# Patient Record
Sex: FEMALE | Employment: UNEMPLOYED | URBAN - METROPOLITAN AREA
[De-identification: names, ages, dates, MRNs, and addresses within clinical notes are randomized per-mention and may not be internally consistent; named-entity substitution may affect disease eponyms.]

---

## 2021-10-14 ENCOUNTER — HOSPITAL ENCOUNTER (OUTPATIENT)
Dept: PHYSICAL THERAPY | Age: 56
Discharge: HOME | End: 2021-10-14

## 2021-10-14 PROCEDURE — 97161 PT EVAL LOW COMPLEX 20 MIN: CPT

## 2021-10-14 NOTE — THERAPY EVALUATION
Cindy Lanza  : 1965  Primary:   Secondary:  [address] at [facility]  [address], [address], [place]  [phone]   [phone]          OUTPATIENT PHYSICAL THERAPY:Initial Assessment 10/14/2021   ICD-10: Treatment Diagnosis: other lymphedema I.89.0  Stiffness of left shoulder not elsewhere classified M 25.612  Precautions/Allergies:   Patient has no known allergies. TREATMENT PLAN:  Effective Dates: 10/14/2021 TO 2022 (90 days). 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 Aquino Ear oncologist : Dr. Mallorie Carrasquillo:  Ms. Baptiste Sites presents following diagnosis and treatment of left breast cancer. She underwent partial mastectomy on the left 8/10/21 and SNB 21.  3 nodes were assessed and were negative. She is currently in radiation. No chemo is scheduled. She has developed limited mobility in the left shoulder. She is at risk for lymphedema. She will benefit from therapeutic exercises, manual therapy and lymphedema education. We will monitor her edema with radiation. If needed we will refer for a compression garment. She is debilitated due to a previous back surgery due to scoliosis. She has CRPS of the left lower extremity and uses a cane for mobility. PROBLEM LIST (Impacting functional limitations):  1. Decreased Strength  2. Increased Pain  3. Decreased Flexibility/Joint Mobility  4. Edema/Girth  5. Decreased Knowledge of Precautions  6. Decreased [place] with Home Exercise Program INTERVENTIONS PLANNED: (Treatment may consist of any combination of the following)  1. Decongestion Therapy  2. Home Exercise Program (HEP)  3. Manual Therapy  4.  Range of Motion (ROM)  5. Therapeutic Exercise/Strengthening     GOALS: (Goals have been discussed and agreed upon with patient.)  Short-Term Functional Goals: Time Frame: 4 weeks  1. The patient will be independent with HEP for ROM within 4 weeks. 2. The patient will have knowledge of signs and symptoms of lymphedema and how to manage within 4 weeks. 3. The patient will gain 10 degrees flexion, abduction and external rotation within 4 weeks  Discharge Goals: Time Frame: 8 weeks  1. .the patient will improve her DASH by 5 within 8 weeks. 2. The patient will improve her LLIS by 5 within 8 weeks. 3. The patient will be fit with a compression garment as necessary within 8 weeks. OUTCOME MEASURE:   Tool Used: Disabilities of the Arm, Shoulder and Hand (DASH) Questionnaire - Quick Version  Score:  Initial: 26/55  Most Recent: X/55 (Date: -- )   Interpretation of Score: The DASH is designed to measure the activities of daily living in person's with upper extremity dysfunction or pain. Each section is scored on a 1-5 scale, 5 representing the greatest disability. The scores of each section are added together for a total score of 55. Tool Used: ECOG Performance Survey Score  Score:  Initial: 1 Most Recent:      Interpretation of Score:   0 Fully active, able to carry on all pre-disease performance without restriction   1 Restricted in physically strenuous activity but ambulatory and able to carry out work of a light or sedentary nature, e.g., light house work, office work   2 Ambulatory and capable of all selfcare but unable to carry out any work activities. Up and about more than 50% of waking hours   3 Capable of only limited selfcare, confined to bed or chair more than 50% of waking hours   4 Completely disabled. Cannot carry on any selfcare. Totally confined to bed or chair   5 Dead        Tool Used: Lymphedema Life Impact Scale   Score:  Initial: 26 Most Recent: X (Date: -- )   Interpretation of Score:  The Lymphedema Life Impact Scale (LLIS) is a validated instrument that measures the physical, functional, and psychosocial concerns pertinent to patients with extremity lymphedema. The Scale's questionnaire is administered to patients to gauge impairments, activity limitations, and participation restrictions resulting from their lymphedema. MEDICAL NECESSITY:   · Patient is expected to demonstrate progress in strength, range of motion and edema management to reduce risk of cellulitis. REASON FOR SERVICES/OTHER COMMENTS:  · Patient continues to demonstrate capacity to improve strength, ROM and edema management which will increase independence. Total Duration:  PT Patient Time In/Time Out  Time In: 0900  Time Out: 1011    Rehabilitation Potential For Stated Goals: Good  Regarding Laurel Joyner's therapy, I certify that the treatment plan above will be carried out by a therapist or under their direction. Thank you for this referral,  Maria Luisa Esteban, PT     Referring Physician Signature: Bslexi, Not On File, MD _______________________________ Date _____________      PAIN/SUBJECTIVE:   Initial: Pain Intensity 1: 3  Pain Location 1: Breast  Pain Orientation 1: Left   Post Session:  3/10   HISTORY:   History of Injury/Illness (Reason for Referral):  Left breast cancer, radiation, partial mastectomy with SNB  Past Medical History/Comorbidities:   Ms. Conrado Arteaga  has no past medical history on file. Ms. Conrado Arteaga  has no past surgical history on file.     Social History/Living Environment:     lives alone  Prior Level of Function/Work/Activity:  Not currently working  Dominant Side:         RIGHT   Ambulatory/Rehab Services H2 Model Falls Risk Assessment   Risk Factors:       (1)  Visual Impairment [specify:  cataracts] Ability to Rise from Chair:       (1)  Pushes up, successful in one attempt   Falls Prevention Plan:       Physical Limitations to Exercise (specify):  scoliosis, CRPS   Total: (5 or greater = High Risk): 2   ©2010 [employer] [place] [facility] Patent #[id number]. Federal Law prohibits the replication, distribution or use without written permission from [facility] [employer] [facility]   Current Medications:       Current Outpatient Medications:     gabapentin (NEURONTIN) 300 mg capsule, TAKE 1 CAPSULE BY MOUTH THREE TIMES DAILY, Disp: , Rfl:     dextroamphetamine/amphetamine (ADDERALL PO), Take  by mouth., Disp: , Rfl:     naproxen sodium (ALEVE) 220 mg tablet, Take 220 mg by mouth two (2) times daily (with meals). , Disp: , Rfl:     duloxetine HCl (CYMBALTA PO), Take  by mouth., Disp: , Rfl:     clonazepam (KLONOPIN PO), Take  by mouth., Disp: , Rfl:     tizanidine HCl (ZANAFLEX PO), Take  by mouth., Disp: , Rfl:     predniSONE (STERAPRED) 5 mg dose pack, TAKE AS DIRECTED, Disp: , Rfl:    Date Last Reviewed:  10/14/21   Number of Personal Factors/Comorbidities that affect the Plan of Care: 1-2: MODERATE COMPLEXITY   EXAMINATION:   Palpation:          Minimal tenderness  ROM:          Right flexion 160, abduction 135, external rotation 54  Left flexion 145, abduction 120, external rotation 38  Strength:          Grossly 4/5  Functional Mobility:         Gait/Ambulation:  Independent with cane, altered gait due to CRPS of the left lower extremity        Transfers:  independent        Bed Mobility:  independent  Skin Integrity:          Pink due to radiation  Edema/Girth:  non-pitting    Left Right    Initial Most Recent Initial Most Recent   Upper  Extremity Base 3rd Finger (cm): 18  Wrist (cm): 15.3  Mid Forearm (cm): 19.6  Elbow (cm): 26.9  Axilla (cm): 33 (42.3, 40)   Base 3rd Finger (cm): 18.7  Wrist (cm): 15.5  Mid Forearm (cm): 21.3  Elbow (cm): 27.5  Axilla (cm): 35.5 (42.7, 38.3)     Lower  Extremity               Body Structures Involved:  1. Joints  2. Muscles  3. lymphatic system Body Functions Affected:  1. Sensory/Pain  2.  Neuromusculoskeletal Activities and Participation Affected:  1.  Mobility   Number of elements (examined above) that affect the Plan of Care: 4+: HIGH COMPLEXITY   CLINICAL PRESENTATION:   Presentation: Stable and uncomplicated: LOW COMPLEXITY   CLINICAL DECISION MAKING:   Use of outcome tool(s) and clinical judgement create a POC that gives a: Clear prediction of patient's progress: LOW COMPLEXITY

## 2021-10-14 NOTE — PROGRESS NOTES
Berta Foster  : 1965  Primary:   Secondary:  [address]  at [facility]  [address], [address], [place] [address]  [phone]   [phone]        OUTPATIENT PHYSICAL THERAPY: Daily Treatment Note 10/14/2021  Visit Count:  1       ICD-10: Treatment Diagnosis: other lymphedema I.89.0  Stiffness of left shoulder not elsewhere classified M 25.612  Precautions/Allergies:   Patient has no known allergies. TREATMENT PLAN:  Effective Dates: 10/14/2021 TO 2022 (90 days). Frequency/Duration: 1 time a week for 90 Day(s)       Pre-treatment Symptoms/Complaints:  My breast doesn't bother me as much as my foot. Pain: Initial: Pain Intensity 1: 3  Pain Location 1: Breast  Pain Orientation 1: Left  /10 Post Session:  3/10   Medications Last Reviewed:  10/14/2021  Updated Objective Findings:  See evaluation note from today  TREATMENT:     THERAPEUTIC EXERCISE: (15 minutes):  Exercises per grid below to improve mobility. Required minimal verbal cues to promote proper body alignment. Progressed range as indicated. MANUAL THERAPY: (10 minutes): Complex decongestive physiotherapy was utilized and necessary because of the patient's risk of edema.    educated in lymphedema,  Instructed in below exercises for mobility. Given written program.  Lanie Contreras PortalAccess Code: [email or web address]  URL: [email or web address]  Date: 10/14/2021  Prepared by: Rachel Sharma    Exercises  Supine Lower Trunk Rotation - 2 x daily - 7 x weekly - 1 sets - 10 reps - 5 hold  Supine Shoulder Flexion with Dowel - 2 x daily - 7 x weekly - 1 sets - 10 reps - 5 hold  Supine Shoulder External Rotation with Dowel - 2 x daily - 7 x weekly - 1 sets - 10 reps - 5 hold  Supine PNF D2 - 2 x daily - 7 x weekly - 1 sets - 10 reps - 5 hold      Treatment/Session Summary:    · Response to Treatment:  tolerated the treatment well.  understood lymphedema and possible need for compression. .  · Communication/Consultation:  HEP  · Equipment provided today:  None today  · Recommendations/Intent for next treatment session: Next visit will focus on assess girth, assess ROM, assess need for compression.     Total Treatment Billable Duration:  25 minutes  PT Patient Time In/Time Out  Time In: 0900  Time Out: KENNY Pritchett    Future Appointments   Date Time Provider Vadim Boland   10/28/2021 10:00 AM [facility]

## 2021-10-28 ENCOUNTER — APPOINTMENT (OUTPATIENT)
Dept: PHYSICAL THERAPY | Age: 56
End: 2021-10-28

## 2021-11-04 ENCOUNTER — APPOINTMENT (OUTPATIENT)
Dept: PHYSICAL THERAPY | Age: 56
End: 2021-11-04

## 2021-11-08 ENCOUNTER — HOSPITAL ENCOUNTER (OUTPATIENT)
Dept: PHYSICAL THERAPY | Age: 56
Discharge: HOME | End: 2021-11-08

## 2021-11-16 ENCOUNTER — HOSPITAL ENCOUNTER (OUTPATIENT)
Dept: PHYSICAL THERAPY | Age: 56
Discharge: HOME | End: 2021-11-16

## 2021-11-16 PROCEDURE — 97140 MANUAL THERAPY 1/> REGIONS: CPT

## 2021-11-16 NOTE — PROGRESS NOTES
Srinivas Jolly  : 1965  Primary:   Secondary:  [address] at [facility]  [address], [address], [place] [address]  [phone]   [phone]        OUTPATIENT PHYSICAL THERAPY: Daily Treatment Note 2021  Visit Count:  2       ICD-10: Treatment Diagnosis: other lymphedema I.89.0  Stiffness of left shoulder not elsewhere classified M 25.612  Precautions/Allergies:   Patient has no known allergies. TREATMENT PLAN:  Effective Dates: 10/14/2021 TO 2022 (90 days). Frequency/Duration: 1 time a week for 90 Day(s)       Pre-treatment Symptoms/Complaints:  My breast is very painful now that radiation is over. The skin is raw and I am having to use cream and pain pills to get relief. Pain: Initial: Pain Intensity 1: 8  Pain Location 1: Breast  Pain Orientation 1: Left  /10 Post Session:  8/10   Medications Last Reviewed:  2021  Updated Objective Findings:  As below   Edema/Girth:  non-pitting    Left Right    Initial Most Recent Initial Most Recent   Upper  Extremity Base 3rd Finger (cm): 17.7  Wrist (cm): 15.5  Mid Forearm (cm): 20.2  Elbow (cm): 28  Axilla (cm): 35.1 (43.6, 43.8)         Lower  Extremity               TREATMENT:     THERAPEUTIC EXERCISE: ( minutes):  Exercises per grid below to improve mobility. Required minimal verbal cues to promote proper body alignment. Progressed range as indicated. MANUAL THERAPY: (31 minutes): Complex decongestive physiotherapy was utilized and necessary because of the patient's risk of edema. assessed skin integrity. Assessed girth. Tissue is fragile and open on the left breast.  Girth is increased in the left upper arm. The patient was advised to stop exercises and allow the tissue to heal.  She will return in 2 weeks. We will assess girth and ROM. We will decide if she needs a compression garment. The edema at this time is due to radiation. Once the tissue heals she will wear a supportive bra. [employer] PortalAccess Code: [email or web address]  URL: [email or web address]  Date: 10/14/2021  Prepared by: Juan Diego Metzger    Exercises  Supine Lower Trunk Rotation - 2 x daily - 7 x weekly - 1 sets - 10 reps - 5 hold  Supine Shoulder Flexion with Dowel - 2 x daily - 7 x weekly - 1 sets - 10 reps - 5 hold  Supine Shoulder External Rotation with Dowel - 2 x daily - 7 x weekly - 1 sets - 10 reps - 5 hold  Supine PNF D2 - 2 x daily - 7 x weekly - 1 sets - 10 reps - 5 hold      Treatment/Session Summary:    · Response to Treatment:  tolerated the treatment fair. The patient was grimacing and holding her breast.  Will need to stop exercises and allow tissue to heal before resuming. · Communication/Consultation:  HEP  · Equipment provided today:  None today  · Recommendations/Intent for next treatment session: Next visit will focus on assess girth, assess ROM, assess need for compression.     Total Treatment Billable Duration:  31 minutes  PT Patient Time In/Time Out  Time In: 1100  Time Out: [facility], PT    Future Appointments   Date Time Provider Vadim Boland   11/30/2021  1:00 PM Nydia Rushing, PT [facility]

## 2021-12-06 ENCOUNTER — HOSPITAL ENCOUNTER (OUTPATIENT)
Dept: PHYSICAL THERAPY | Age: 56
Discharge: HOME | End: 2021-12-06

## 2021-12-06 PROCEDURE — 97110 THERAPEUTIC EXERCISES: CPT

## 2021-12-06 PROCEDURE — 97140 MANUAL THERAPY 1/> REGIONS: CPT

## 2021-12-06 NOTE — PROGRESS NOTES
Pasquale Mean  : 1965  Primary:   Secondary:  [address]  at [facility]  [address], [address], [place] [address]  [phone]   [phone]        OUTPATIENT PHYSICAL THERAPY: Daily Treatment Note 2021  Visit Count:  3       ICD-10: Treatment Diagnosis: other lymphedema I.89.0  Stiffness of left shoulder not elsewhere classified M 25.612  Precautions/Allergies:   Patient has no known allergies. TREATMENT PLAN:  Effective Dates: 10/14/2021 TO 2022 (90 days). Frequency/Duration: 1 time a week for 90 Day(s)       Pre-treatment Symptoms/Complaints:  The patient reports her breast has healed. She reports she has been doing her exercises, but forgot to start wearing a sports bra. She reports her radiation doctor feels she has breast lymphedema. Pain: Initial: Pain Intensity 1: 0  /10 Post Session:  0/10   Medications Last Reviewed:  2021  Updated Objective Findings:  As below   Edema/Girth:  non-pitting    Left Right    Initial Most Recent Initial Most Recent   Upper  Extremity Base 3rd Finger (cm): 18.1  Wrist (cm): 15.5  Mid Forearm (cm): 19.5  Elbow (cm): 27.1  Axilla (cm): 33 (41.2, 41.9)         Lower  Extremity             Flexion 175, abduction 175, external rotation 80  TREATMENT:     THERAPEUTIC EXERCISE: (19 minutes):  Exercises per grid below to improve mobility. Required minimal verbal cues to promote proper body alignment. Progressed range as indicated. MANUAL THERAPY: (30 minutes): Complex decongestive physiotherapy was utilized and necessary because of the patient's risk of edema. assessed skin integrity. Tissue is healed with the exception of a small area along the upper medial breast which has been open since before surgery per the patient. Assessed girth. Girth is improved. Assessed ROM and it is within normal limits. She performed all below exercises. Added corner stretch.   A pad was fashioned with 1/2 inch gray foam, cotton padding and gauze for the breast to be worn in a sports bra. She will return in 2 weeks. We will assess girth and ROM. The edema at this time is due to radiation. [employer] PortalAccess Code: [email or web address]  URL: [email or web address]  Date: 10/14/2021  Prepared by: Palomo Midfield    Exercises  Supine Lower Trunk Rotation - 2 x daily - 7 x weekly - 1 sets - 10 reps - 5 hold  Supine Shoulder Flexion with Dowel - 2 x daily - 7 x weekly - 1 sets - 10 reps - 5 hold  Supine Shoulder External Rotation with Dowel - 2 x daily - 7 x weekly - 1 sets - 10 reps - 5 hold  Supine PNF D2 - 2 x daily - 7 x weekly - 1 sets - 10 reps - 5 hold    Access Code: [email or web address]  URL: [email or web address]  Date: 12/06/2021  Prepared by: Stacia Vazquez    Exercises  Corner Pec Major Stretch - 2 x daily - 7 x weekly - 1 sets - 5 reps - 5 hold    Treatment/Session Summary:    · Response to Treatment:  tolerated the treatment well. ROM and Girth improved. The patient was grimacing and holding her breast.  Will benefit from breast compression. · Communication/Consultation:  HEP  Wear the pad in a sports bra. Increase tolerance to all day. · Equipment provided today:  None today  · Recommendations/Intent for next treatment session: Next visit will focus on assess girth, assess ROM, assess need for compression.     Total Treatment Billable Duration:  49 minutes  PT Patient Time In/Time Out  Time In: 1043  Time Out: [address], PT    Future Appointments   Date Time Provider Vadim Boland   12/22/2021 11:00 AM Addy Rushing, PT [facility]

## 2021-12-22 ENCOUNTER — HOSPITAL ENCOUNTER (OUTPATIENT)
Dept: PHYSICAL THERAPY | Age: 56
Discharge: HOME | End: 2021-12-22

## 2021-12-22 NOTE — PROGRESS NOTES
The patient called to cancel oncology rehab. She will be attending another location due to having sponsorship.

## 2022-01-04 NOTE — THERAPY DISCHARGE
Stephania Goldmann  : 1965  Primary:   Secondary:  [address] at [facility]  [address], [address], [place] [address]  [phone]   [phone]          OUTPATIENT PHYSICAL THERAPY:Discontinuation Summary 2021   ICD-10: Treatment Diagnosis: other lymphedema I.89.0  Stiffness of left shoulder not elsewhere classified M 25.612  Precautions/Allergies:   Patient has no known allergies. TREATMENT PLAN:  Effective Dates: 10/14/2021 TO 2022 (90 days). 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 Mark Fuentes oncologist : Dr. Marisela Becerra:  Ms. Yaneli Villagomez presents following diagnosis and treatment of left breast cancer. She underwent partial mastectomy on the left 8/10/21 and SNB 21.  3 nodes were assessed and were negative. She is currently in radiation. No chemo is scheduled. She has developed limited mobility in the left shoulder. She is at risk for lymphedema. She will benefit from therapeutic exercises, manual therapy and lymphedema education. We will monitor her edema with radiation. If needed we will refer for a compression garment. She is debilitated due to a previous back surgery due to scoliosis. She has CRPS of the left lower extremity and uses a cane for mobility. 22: The patient attended a total of 3 visits. She called to cancel her remaining appointments. She will be going to another location due to having sponsorship there. No further visits. Discontinue at this time. PROBLEM LIST (Impacting functional limitations):  1. Decreased Strength  2. Increased Pain  3. Decreased Flexibility/Joint Mobility  4. Edema/Girth  5. Decreased Knowledge of Precautions  6.  Decreased [place] with Home Exercise Program INTERVENTIONS PLANNED: (Treatment may consist of any combination of the following)  1. Decongestion Therapy  2. Home Exercise Program (HEP)  3. Manual Therapy  4. Range of Motion (ROM)  5. Therapeutic Exercise/Strengthening     GOALS: (Goals have been discussed and agreed upon with patient.)  Short-Term Functional Goals: Time Frame: 4 weeks  1. The patient will be independent with HEP for ROM within 4 weeks. Met   2. The patient will have knowledge of signs and symptoms of lymphedema and how to manage within 4 weeks. Met   3. The patient will gain 10 degrees flexion, abduction and external rotation within 4 weeks  Discharge Goals: Time Frame: 8 weeks  1. .the patient will improve her DASH by 5 within 8 weeks. 2. The patient will improve her LLIS by 5 within 8 weeks. 3. The patient will be fit with a compression garment as necessary within 8 weeks. OUTCOME MEASURE:   Tool Used: Disabilities of the Arm, Shoulder and Hand (DASH) Questionnaire - Quick Version  Score:  Initial: 26/55  Most Recent: X/55 (Date: -- )   Interpretation of Score: The DASH is designed to measure the activities of daily living in person's with upper extremity dysfunction or pain. Each section is scored on a 1-5 scale, 5 representing the greatest disability. The scores of each section are added together for a total score of 55. Tool Used: ECOG Performance Survey Score  Score:  Initial: 1 Most Recent:      Interpretation of Score:   0 Fully active, able to carry on all pre-disease performance without restriction   1 Restricted in physically strenuous activity but ambulatory and able to carry out work of a light or sedentary nature, e.g., light house work, office work   2 Ambulatory and capable of all selfcare but unable to carry out any work activities.  Up and about more than 50% of waking hours   3 Capable of only limited selfcare, confined to bed or chair more than 50% of waking hours   4 Completely disabled. Cannot carry on any selfcare. Totally confined to bed or chair   5 Dead        Tool Used: Lymphedema Life Impact Scale   Score:  Initial: 26 Most Recent: X (Date: -- )   Interpretation of Score: The Lymphedema Life Impact Scale (LLIS) is a validated instrument that measures the physical, functional, and psychosocial concerns pertinent to patients with extremity lymphedema. The Scale's questionnaire is administered to patients to gauge impairments, activity limitations, and participation restrictions resulting from their lymphedema. Total Duration:  PT Patient Time In/Time Out  Time In: 1043  Time Out: 1132    Rehabilitation Potential For Stated Goals: Good  Regarding Sherley Joyner's therapy, I certify that the treatment plan above will be carried out by a therapist or under their direction.   Thank you for this referral,  Markel Clark, PT     Referring Physician Signature: Bsi, Not On File, MD _______________________________ Date _____________